# Patient Record
Sex: MALE | Race: WHITE | NOT HISPANIC OR LATINO | Employment: FULL TIME | URBAN - METROPOLITAN AREA
[De-identification: names, ages, dates, MRNs, and addresses within clinical notes are randomized per-mention and may not be internally consistent; named-entity substitution may affect disease eponyms.]

---

## 2023-12-15 ENCOUNTER — TELEMEDICINE (OUTPATIENT)
Dept: FAMILY MEDICINE CLINIC | Facility: TELEHEALTH | Age: 43
End: 2023-12-15
Payer: COMMERCIAL

## 2023-12-15 DIAGNOSIS — U07.1 COVID: Primary | ICD-10-CM

## 2023-12-15 NOTE — PROGRESS NOTES
You have chosen to receive care through a telehealth visit.  Do you consent to use a video/audio connection for your medical care today? Yes     CHIEF COMPLAINT  Chief Complaint   Patient presents with    COVID positive         HPI  Karlos Falcon is a 43 y.o. male  presents with complaint of COVID positive home test.  Symptoms started yesterday.  He states that he is congested and would like the antiviral    Review of Systems   HENT:  Positive for congestion.    All other systems reviewed and are negative.      History reviewed. No pertinent past medical history.    History reviewed. No pertinent family history.    Social History     Socioeconomic History    Marital status:        Karlos Falcon  has no history on file for tobacco use.. I          There were no vitals taken for this visit.    PHYSICAL EXAM  Physical Exam   Constitutional: He appears well-developed and well-nourished.   Eyes: Pupils are equal, round, and reactive to light.   Pulmonary/Chest: Effort normal.   Musculoskeletal: Normal range of motion.   Neurological: He is alert.   Psychiatric: He has a normal mood and affect.       No results found for this or any previous visit.    Diagnoses and all orders for this visit:    1. COVID (Primary)  -     Nirmatrelvir & Ritonavir, 300mg/100mg, (PAXLOVID) 20 x 150 MG & 10 x 100MG tablet therapy pack tablet; Take 3 tablets by mouth 2 (Two) Times a Day for 5 days.  Dispense: 30 tablet; Refill: 0          FOLLOW-UP  As discussed during visit with PCP/[facility] if no improvement or Urgent Care/Emergency Department if worsening of symptoms    Patient verbalizes understanding of medication dosage, comfort measures, instructions for treatment and follow-up.    TAVON Cai  12/15/2023  06:35 EST    The use of a video visit has been reviewed with the patient and verbal informed consent has been obtained. Myself and Karlos Falcon participated in this visit. The patient is located in [address] [place] [address]  [address] [place] [id number].    I am located in [place], [place]. [employer] and [employer] were utilized. I spent 10 minutes in the patient's chart for this visit.

## 2023-12-15 NOTE — LETTER
E VIRTUAL CARE  [facility]  [address]  [address]  [facility] [address]  Phone: [phone]  Fax: [phone]    Karlos Falcon was seen and treated in our Urgent Care on 12/15/2023.  He may return to work on 12/21/23.  COVID quarantine    .        Thank you for choosing [facility].      TAVON Benson

## 2024-11-17 ENCOUNTER — E-VISIT (OUTPATIENT)
Dept: FAMILY MEDICINE CLINIC | Facility: TELEHEALTH | Age: 44
End: 2024-11-17
Payer: COMMERCIAL

## 2024-11-17 NOTE — E-VISIT TREATED
Date: 2024 08:45:07  Clinician: Sara Block  Clinician NPI: [id number]  Patient: Karlos Falcon  Patient : 1980  Patient Address: [address] [place], [place] [address]  Patient Phone: [phone]  Visit Protocol: Ear pain  Patient Summary:  Karlos is a 44 year old ( : 1980 ) male who initiated a visit for swimmer's ear (outer ear infection).     Karlos reports that the ear pain started 2-4 days ago. The ear pain is located outside (external surface) and inside   the right ear.   In addition to the ear pain, Karlos is experiencing tenderness, a feeling of fullness, and changes in hearing in the ear(s). The ear(s) is tender to the touch on the ear canal.   Symptom Details   Pain: Karlos is experiencing moderate   pain (4-6 on a 10 point pain scale). It gets worse when Karlos eats or chews and gently pulls on the earlobe(s).    Karlos denies ringing, change in color of the affected area, itchiness, and blistering in the ear(s). Karlos also denies recent   injuries near the ear(s), the possibility of a foreign object in the ear(s), feeling feverish, having fluid draining from the ear(s), and ever having ear tubes.   Precipitating events   Karlos denies flying and swimming within the past week.   Pertinent   medical history   Karlos denies having immunosuppressive conditions (e.g., chemotherapy, HIV, organ transplant, long-term use of steroids or other immunosuppressive medications, splenectomy).   Karlos does not have diabetes.   Weight: 385 lbs (174.63   kg)    MEDICATIONS: finasteride oral, allopurinol oral, Avalide oral, ALLERGIES: NKDA  Clinician Response:  Dear Karlos,   Based upon the information you provided, you may have otitis externa. External otitis, also known as swimmer's ear, is a condition that occurs when the ear canal becomes irritated or infected.   Medication information    I am prescribing:     Ciprodex 0.3-0.1% otic ear drops. Instill 4 drops into affected  ear(s) 2 times per day for 7 days. There are no refills with this prescription.   Instructions for using eardrops:     Lie on your side or tilt your head    Insert the drops into your ear canal    Lie on your side or insert a cotton ball in the ear canal for 5 minutes    Use the medication for the number of days recommended, even if you begin to feel better within a few days after starting the drops     Self care  Avoid getting water inside your ear while you are being treated for swimmer's ear.  Use a cotton ball coated with petroleum jelly to protect your ears when bathing and showering.  Do not go swimming or diving for the next 7-10 days.  Do not   wear headphones or hearing aid in the infected ears until your symptoms improve.  When to seek care  Please make an appointment to be seen in a clinic or urgent care if any of the following occur:     Symptoms do not improve within 2 days    New symptoms develop or symptoms becomes worse      Diagnosis: Otitis externa  Diagnosis ICD: H60.399    Follow up instructions:  ATTENTION: If you have been prescribed medications, your prescriptions will not be sent until you choose your pharmacy. To do so open the link within your notification, or go to [employer] and click eVisit in the menu to open your   treatment plan. From there, you can select your pharmacy at the bottom of your after visit summary. You can also go to [email or web address]   Prescriptions  Prescription: Ciprodex 0.3-0.1 % otic (ear) drops,suspension, instill 4 drops into affected ear(s) 2 times per day for 7 days  Sent To: [facility]/pharmacy #[id number] - [id number] - [address] [place] [address]

## 2025-01-26 ENCOUNTER — E-VISIT (OUTPATIENT)
Dept: FAMILY MEDICINE CLINIC | Facility: TELEHEALTH | Age: 45
End: 2025-01-26
Payer: COMMERCIAL

## 2025-01-26 PROCEDURE — FABRICHEALTHVISIT: Performed by: NURSE PRACTITIONER

## 2025-04-13 ENCOUNTER — E-VISIT (OUTPATIENT)
Dept: FAMILY MEDICINE CLINIC | Facility: TELEHEALTH | Age: 45
End: 2025-04-13
Payer: COMMERCIAL

## 2025-04-13 NOTE — E-VISIT TREATED
Date: 2025 09:37:45  Clinician: Sara Block  Clinician NPI: [id number]  Patient: Karlos Falcon  Patient : 1980  Patient Address: [facility] [address] [place] [id number]  Patient Phone: [phone]  Visit Protocol: URI  Patient Summary:  Karlos is a 44 year old ( : 1980 ) male who initiated a visit for cold, sinus infection, or influenza.     Karlos states the symptoms started gradually 3-5 days ago. After the symptoms started, they improved and then got   worse again.   Symptom start date: 2025   The symptoms consist of nasal congestion, malaise, myalgia, a sore throat, anosmia or ageusia, a cough, and facial pain or pressure. Karlos is experiencing difficulty breathing due to nasal congestion but   is not short of breath.   Symptom details     Nasal secretions: The color of the mucus is white and yellow.    Cough: Karlos coughs every 5-10 minutes and the cough is not more bothersome at night. Phlegm comes into the throat when coughing. Karlos believes the cough is caused by post-nasal drip. The color of the phlegm is white and yellow.     Sore throat: Karlos reports having moderate throat pain (4-6 on a 10 point pain scale), does not have exudate on the tonsils, and can swallow liquids with mild discomfort. Karlos is not sure if the lymph nodes in the neck are enlarged. A rash has not   appeared on the skin since the sore throat started.     Facial pain or pressure: The facial pain or pressure feels worse when bending over or leaning forward.      Karlos denies having teeth pain, ear pain, rhinitis, vomiting, chills, diarrhea, nausea, fever, headache, and wheezing. Karlos also denies having recent facial or sinus surgery in the past 60 days, pain in the front of the neck that sometimes moves   to the ear, taking antibiotic medication in the past month, and experiencing difficulty opening their mouth due to pain or swelling in the jaw muscles.   Precipitating events   Karlos is not sure if they have been exposed to someone with strep throat.   Karlos has not recently been exposed to someone with influenza. Karlos has not been in close contact with any high risk individuals.    Karlos has not received the influenza vaccination.   Pertinent COVID-19 (Coronavirus) information  Since the   symptoms started, Karlos has tested for COVID-19. The result of the test was negative. The negative result was within the last 48 hours.   Karlos has not received a COVID-19 vaccine in the past year.   Pertinent medical history    Karlos reports   having the following conditions:   Hypertension    Karlos had 1 sinus infection within the past year.   A provider has not told Karlos to avoid NSAIDs.   Karlos does not have diabetes. Karlos denies having immunosuppressive conditions (e.g.,   chemotherapy, HIV, organ transplant, long-term use of steroids or other immunosuppressive medications, splenectomy). Karlos does not have asthma.   Karlos does not smoke or use smokeless tobacco. Karlos does not vape or use other e-cigarette products.     Weight: 380 lbs (172.37 kg)    MEDICATIONS: finasteride oral, allopurinol oral, Avalide oral, ALLERGIES: NKDA  Clinician Response:  Dear Karlos,  Based on the information provided, you have viral sinusitis, also known as a sinus infection. Most cases of sinus infections are caused by viruses and symptoms start to improve on their own in 7-10 days. Both viral and   bacterial sinus infections usually resolve on its own. A bacterial infection may have developed if any of the following apply to you.      Symptoms of sinus infection lasting 10 days or more without showing any improvement    If you feel better after a viral upper respiratory infection such as, a cold but then suddenly feel worse with symptoms such as fever, headache, or increase in nasal discharge     Medication information  Because you have a viral infection, antibiotics will not help you  get better. Treating a viral infection with antibiotics could actually make you feel worse.  For more information on why I am not prescribing antibiotics, please   click here for more information: Antibiotics Aren't Always the Answer.  I am prescribing:       Fluticasone 50 mcg/actuation nasal spray. Spray 2 times in each nostril 1 time per day; after 1 week, may adjust to 1 - 2 sprays in each nostril 1 time per day. This medication takes several days to start working, so keep taking it even if it doesn't   help right away. There are no refills with this prescription.      Benzonatate 200 mg oral capsule. Take 1 capsule 3 times a day as needed for cough. There are no refills with this prescription.     Tips for using a nasal spray:     When the medication is being sprayed in your nose, point the tip of the nasal spray towards your ear.    Do not blow your nose after using the spray.    Do not lean your head back after using the spray as it will go down your throat.    Wipe the tip of the nose piece after use with a dry, clean tissue.     Self care  Steps you can take to be as comfortable as possible:     Rest.    Drink plenty of fluids.    Take a warm shower to loosen congestion.    Use a cool-mist humidifier.    Use throat lozenges.    Suck on frozen items such as popsicles.    Drink hot tea with lemon and honey.    Gargle with warm salt water (1/4 teaspoon of salt per 8 ounce glass of water).    Take a spoonful of honey to reduce your cough.     When to seek care  Please be seen in a clinic or urgent care if any of the following occur:   New symptoms develop, or symptoms become worse   Call 911 or go to the emergency room if any of the following occur:     Difficulty breathing    If you feel that your throat is closing off    Suddenly develop a rash    Unable to swallow fluids or are drooling     For the latest updates on COVID-19 (Coronavirus), please visit the Centers for Disease Control and Prevention (CDC).  Also, your state and local health department websites may provide additional guidance regarding testing and isolation recommendations   for your location.   Diagnosis: Viral sinusitis  Diagnosis ICD: J01.90    Follow up instructions:  ATTENTION: If you have been prescribed medications, your prescriptions will not be sent until you choose your pharmacy. To do so open the link within your notification, or go to [employer] and click eVisit in the menu to open your   treatment plan. From there, you can select your pharmacy at the bottom of your after visit summary. You can also go to [email or web address]   Prescriptions  Prescription: prednisone 10 mg oral tablets,dose pack, take tablets,dose pack  [facility] [place] [facility] [id number] - [address] [place] [address]  Prescription: benzonatate 200 mg oral capsule, take 1 capsule 3 times a day as needed for cough  Sent To: [facility] [place] [facility] [id number] [place] [address]  Prescription: fluticasone 50 mcg/actuation nasal spray,suspension, spray 2 times in each nostril 1 time per day; after 1 week, may adjust to 1 - 2 sprays in each nostril 1 time per day.  Sent To: [facility] [place] [facility] [id number] - [address] [place] [address]

## 2025-06-10 NOTE — E-VISIT TREATED
06/10/2025    Dear Gera Zurita,     Below are important post-procedure care instructions to help ensure a smooth recovery.    General Post-Procedure Care    ? Discharge: You have been discharged home in stable condition.  ? Supervision: A responsible adult should stay with you for the next 12-24 hours.  ? Activity Restrictions:    You may feel drowsy due to sedation; avoid driving, operating heavy machinery, making important decisions, or drinking alcohol for the rest of the day.  Resume normal activities tomorrow unless otherwise directed by your doctor.  Diet & Hydration    ? After Upper Endoscopy (EGD):    Start with clear liquids and soft foods. You may gradually return to your normal diet as tolerated.  Avoid hot, spicy, or acidic foods for the rest of the day to prevent throat irritation.    ? After Colonoscopy:    Begin with a light meal and plenty of fluids.  Avoid heavy, greasy, or spicy foods for the first 24 hours to minimize stomach upset.  Resume fiber intake gradually to avoid bloating or discomfort.  Common Post-Procedure Symptoms    It is normal to experience:  ? Mild sore throat or hoarseness (should improve within 24 hours).  ? Bloating, gas, or mild cramping due to air introduced during the colonoscopy.  ? Some fatigue or grogginess from sedation.  ? A small amount of rectal bleeding (especially if polyps were removed).    Warning Signs - When to Call    Please seek immediate medical attention or call [insert emergency contact number] if you experience:  ?? Severe or persistent chest or abdominal pain.  ?? Difficulty breathing or swallowing.  ?? Vomiting blood or passing large amounts of blood in stool.  ?? Fever over 101°F or signs of infection.  ?? Severe dizziness or fainting.  ?? Persistent nausea or vomiting.    Follow-Up & Results    ?? If biopsies were taken or polyps removed, we will contact you with results via [email or web address] or via phone call.  ?? If you have any questions or  Date: 2025 09:35:31  Clinician: Ioana Quiñonez  Clinician NPI: [id number]  Patient: Karlos Falcon  Patient : 1980  Patient Address: [facility] [address] [place] [id number]  Patient Phone: [phone]  Visit Protocol: URI  Patient Summary:  Karlos is a 44 year old ( : 1980 ) male who initiated a visit for cold, sinus infection, or influenza.     Karlos states the symptoms started suddenly 3-5 days ago.   Symptom start date: 2025   The symptoms consist of a   headache, rhinitis, myalgia, nasal congestion, a cough, facial pain or pressure, wheezing, and malaise.   Symptom details     Nasal secretions: The color of the mucus is green and yellow.    Cough: Karlos coughs almost every minute and the cough is more bothersome at night. Phlegm comes into the throat when coughing. Karlos believes the cough is caused by post-nasal drip. The color of the phlegm is yellow, clear, and green.     Wheezing: Wheezing never impacts daily activities.     Facial pain or pressure: The facial pain or pressure feels worse when bending over or leaning forward.     Headache: The headache is mild (1-3 on a 10 point pain scale).      Karlos denies having ear pain, vomiting, anosmia and ageusia, nausea, chills, diarrhea, teeth pain, fever, and sore throat. Karlos also denies having recent facial or sinus surgery in the past 60 days, having a sinus infection within the past year,   and double sickening (worsening symptoms after initial improvement). Karlos is not experiencing dyspnea.   Precipitating events  Karlos has not recently been exposed to someone with influenza. Karlos has not been in close contact with any high risk   individuals.    Karlos has not received the influenza vaccination.   Pertinent COVID-19 (Coronavirus) information  Since the symptoms started, Karlos has tested for COVID-19. The result of the test was negative. The negative result was within the last   48 hours.    "concerns, please contact our office at [phone].    We recommend the following:  Psyllium husk daily.  Magnesium Glycinate daily.  Ground Flaxseed daily.  Probiotics (Florastor or align) daily.     We genuinely value your feedback and believe that it plays a crucial role in our pursuit of excellence. We kindly request you to take a few minutes of your time to share your experience with us by posting a Google review. Your honest thoughts and opinions can make a significant difference for others seeking healthcare services and will help us better understand how we can further enhance our patient care.    Thank you for trusting us with your care,          Luciano Dunn M.D.  click on the link for contact information.           At [facility] we offer virtual visits. Please use your [facility] juany to schedule a virtual visit.     To rate your experience with Dr. Dunn, click on this link    To post a review, simply follow these quick steps:  1. Visit [facility] or search for my name on Google.  2. Click on the "Write a review" button on the left-hand side of the page.  3. Rate your experience by choosing the number of stars that reflect your satisfaction.  4. Share your thoughts and insights about your visit - we'd love to hear your feedback!        " Karlos has received a COVID-19 vaccine in the past year.     Pertinent medical history     Karlos has taken an antibiotic medication for similar symptoms in the past month. Antibiotic name as reported by the patient (free text): zpack   A   provider has not told Karlos to avoid NSAIDs.   Karlos does not have diabetes. Karlos denies having immunosuppressive conditions (e.g., chemotherapy, HIV, organ transplant, long-term use of steroids or other immunosuppressive medications,   splenectomy). Karlos does not have asthma.   Karlos denies having chronic lung disease, cystic fibrosis, hypertension, long-term disabilities, mental health conditions, sickle cell disease or thalassemia, stroke or other cardiovascular disease,   substance use disorders, or tuberculosis (TB).  Karlos does not smoke or use smokeless tobacco. Karlos does not vape or use other e-cigarette products.   Additional information as reported by the patient (free text): an antibiotic and a steroid have   helped me get over similar issues in the past   Weight: 350 lbs (158.76 kg)    MEDICATIONS: finasteride oral, allopurinol oral, Avalide oral, ALLERGIES: NKDA  Clinician Response:  Dear Karlos,  Based on the information provided, you have acute bacterial sinusitis, also known as a sinus infection. Most cases of sinus infections are caused by viruses and symptoms start to improve on their own in 7-10 days. Both   viral and bacterial sinus infections usually resolve on its own. A bacterial infection may have developed if any of the following apply to you.      Symptoms of sinus infection lasting 10 days or more without showing any improvement    If you feel better after a viral upper respiratory infection such as, a cold but then suddenly feel worse with symptoms such as fever, headache, or increase in nasal discharge     Medication information  I am prescribing:     Azithromycin (Zithromax Z-Christian) 250 mg oral tablet. Take 2 tablets by mouth on day  1, then 1 tablet by mouth on days 2-5. There are no refills with this prescription.   Self care  Steps you can take to be as comfortable as possible:     Rest.    Drink plenty of fluids.    Take a warm shower to loosen congestion.    Use a cool-mist humidifier.    Take a spoonful of honey to reduce your cough.     When to seek care  Please be seen in a clinic or urgent care if any of the following occur:     New symptoms develop, or symptoms become worse    Symptoms do not start to improve after 3 days of treatment     Call 911 or go to the emergency room if any of the following occur:     If you feel that your throat is closing off    Suddenly develop a rash    Unable to swallow fluids or are drooling     It is possible to have an allergic reaction to an antibiotic even if you have not had one in the past. If you notice a new rash, significant swelling, or difficulty breathing, stop taking this medication immediately and go to a clinic or urgent care.    For the latest updates on COVID-19 (Coronavirus), please visit the Centers for Disease Control and Prevention (CDC). Also, your state and local health department websites may provide additional guidance regarding testing and isolation recommendations for   your location.   Diagnosis: Acute bacterial sinusitis  Diagnosis ICD: J01.90    Follow up instructions: ATTENTION: If you have been prescribed medications, your prescriptions will not be sent until you choose your pharmacy.  To do so open the link within your notification, or go to [employer] and click eVisit in the menu to open your   treatment plan. From there, you can select your pharmacy at the bottom of your after visit summary. You can also go to [email or web address]  Prescriptions  Prescription: azithromycin (Zithromax Z-Christian) 250 mg oral tablet, take 2 tablets by mouth on day 1, then 1 tablet by mouth on days 2-5  Sent To: [facility]/pharmacy #[id number] - [id number] - [address] [facility] [place] [address]  Prescription: prednisone 20 mg oral tablet, take 1 tablet by mouth 2 times per day for 5 days  Sent To: [facility]/pharmacy #[id number] - [id number] - [address] [place] [address]